# Patient Record
Sex: FEMALE | Race: WHITE | NOT HISPANIC OR LATINO | URBAN - METROPOLITAN AREA
[De-identification: names, ages, dates, MRNs, and addresses within clinical notes are randomized per-mention and may not be internally consistent; named-entity substitution may affect disease eponyms.]

---

## 2024-05-13 ENCOUNTER — OFFICE (OUTPATIENT)
Dept: URBAN - METROPOLITAN AREA CLINIC 19 | Facility: CLINIC | Age: 44
End: 2024-05-13

## 2024-05-13 VITALS
HEART RATE: 79 BPM | HEIGHT: 67 IN | SYSTOLIC BLOOD PRESSURE: 121 MMHG | DIASTOLIC BLOOD PRESSURE: 85 MMHG | OXYGEN SATURATION: 97 % | WEIGHT: 137 LBS

## 2024-05-13 DIAGNOSIS — D50.9 IRON DEFICIENCY ANEMIA, UNSPECIFIED: ICD-10-CM

## 2024-05-13 DIAGNOSIS — K59.00 CONSTIPATION, UNSPECIFIED: ICD-10-CM

## 2024-05-13 DIAGNOSIS — R14.0 ABDOMINAL DISTENSION (GASEOUS): ICD-10-CM

## 2024-05-13 DIAGNOSIS — I34.1 NONRHEUMATIC MITRAL (VALVE) PROLAPSE: ICD-10-CM

## 2024-05-13 PROCEDURE — 99204 OFFICE O/P NEW MOD 45 MIN: CPT

## 2024-05-13 RX ORDER — POLYETHYLENE GLYCOL 3350, SODIUM SULFATE, POTASSIUM CHLORIDE, MAGNESIUM SULFATE, AND SODIUM CHLORIDE FOR ORAL SOLUTION 178.7-7.3G
KIT ORAL
Qty: 1 | Refills: 0 | Status: ACTIVE
Start: 2024-05-13

## 2024-05-13 NOTE — SERVICEHPINOTES
43-year-old female is here with complaints of constipation and severe bloating.  Drinks 1 carbonated water once a week.  Avoids dairy and drinks and unsweetened almond milk.  She is not following a gluten free diet.  Does not take any NSAIDs.  Does not use any artificial sweeteners.  Reports 5 bowel movements per week with a sense of incomplete evacuation as well as hard stool and straining.  Using milk of magnesia as well as magnesium citrate and fiber gummies which have not improved her bowel movements.  Denies melena or hematochezia or hematemesis.  Denies any upper GI symptoms or abdominal pain.  She has not been on any recent antibiotics.  Paternal grandfather with colon cancer.  No family history of colon polyps or IBD.  Informs me she has a history of mitral valve prolapse and was recently evaluated by Dr. Hari Abdul.  Reports normal echocardiogram.  I do not have access to these records at this time.  No history of myocardial infarction or cardiac stents.  Not taking any blood thinners or weight loss medications.  Informs me she has heavy menstrual cycles.  Does not donate any blood.  Outside progress note from PCP's office in January 2024 was reviewed.  Outside labs at that time revealed normal CMP, lipid panel, TSH, and vitamin-D.  Hemoglobin 12.5, WBC 3.8, rest of CBC was normal.  Colonoscopy in November 2009 was evaluated the cecum with good bowel prep.  No adenomatous polyps noted.